# Patient Record
Sex: FEMALE | Race: WHITE
[De-identification: names, ages, dates, MRNs, and addresses within clinical notes are randomized per-mention and may not be internally consistent; named-entity substitution may affect disease eponyms.]

---

## 2019-06-15 ENCOUNTER — HOSPITAL ENCOUNTER (EMERGENCY)
Dept: HOSPITAL 59 - ER | Age: 49
Discharge: HOME | End: 2019-06-15
Payer: COMMERCIAL

## 2019-06-15 DIAGNOSIS — R20.0: ICD-10-CM

## 2019-06-15 DIAGNOSIS — R42: Primary | ICD-10-CM

## 2019-06-15 DIAGNOSIS — R05: ICD-10-CM

## 2019-06-15 DIAGNOSIS — R11.0: ICD-10-CM

## 2019-06-15 DIAGNOSIS — R07.89: ICD-10-CM

## 2019-06-15 DIAGNOSIS — I10: ICD-10-CM

## 2019-06-15 LAB
ABSOLUTE NEUTROPHIL COUNT: 11.14
ALBUMIN SERPL-MCNC: 4.7 G/DL (ref 4–5)
ALBUMIN/GLOB SERPL: 1.7 {RATIO} (ref 1.1–1.8)
ALP SERPL-CCNC: 118 U/L (ref 35–104)
ALT SERPL-CCNC: 33 U/L (ref ?–33)
ANION GAP SERPL CALC-SCNC: 19 MMOL/L (ref 7–16)
APTT BLD: 25.8 SECONDS (ref 24.5–39.1)
AST SERPL-CCNC: 29 U/L (ref 10–35)
BASOPHILS NFR BLD: 0.5 % (ref 0–6)
BILIRUB SERPL-MCNC: 0.5 MG/DL (ref 0.2–1)
BUN SERPL-MCNC: 18 MG/DL (ref 6–20)
CO2 SERPL-SCNC: 24 MMOL/L (ref 22–29)
CREAT SERPL-MCNC: 0.9 MG/DL (ref 0.5–0.9)
CREAT SERPL-MCNC: 1.2 MG/DL (ref 0.5–0.9)
EOSINOPHIL NFR BLD: 1.1 % (ref 0–6)
ERYTHROCYTE [DISTWIDTH] IN BLOOD BY AUTOMATED COUNT: 12.8 % (ref 11.5–14.5)
EST GLOMERULAR FILTRATION RATE: 51 ML/MIN
EST GLOMERULAR FILTRATION RATE: > 60 ML/MIN
GLOBULIN SER-MCNC: 2.8 GM/DL (ref 1.4–4.8)
GLUCOSE SERPL-MCNC: 167 MG/DL (ref 74–109)
GRANULOCYTES NFR BLD: 79.4 % (ref 47–80)
HCT VFR BLD CALC: 44.9 % (ref 35–47)
HGB BLD-MCNC: 15.7 GM/DL (ref 11.6–16)
INR PPP: 1.1
LYMPHOCYTES NFR BLD AUTO: 13.7 % (ref 16–45)
MCH RBC QN AUTO: 33.5 PG (ref 27–33)
MCHC RBC AUTO-ENTMCNC: 35 G/DL (ref 32–36)
MCV RBC AUTO: 95.7 FL (ref 81–97)
MONOCYTES NFR BLD: 5.3 % (ref 0–9)
PLATELET # BLD: 265 K/UL (ref 130–400)
PMV BLD AUTO: 11.4 FL (ref 7.4–10.4)
PROT SERPL-MCNC: 7.5 G/DL (ref 6.6–8.7)
PROTHROMBIN TIME: 10.8 SECONDS (ref 9.5–12.1)
RBC # BLD AUTO: 4.69 M/UL (ref 3.8–5.4)
TSH SERPL-ACNC: 1.51 UIU/ML (ref 0.27–4.2)
WBC # BLD AUTO: 14 K/UL (ref 4.2–12.2)

## 2019-06-15 PROCEDURE — 84484 ASSAY OF TROPONIN QUANT: CPT

## 2019-06-15 PROCEDURE — 93005 ELECTROCARDIOGRAM TRACING: CPT

## 2019-06-15 PROCEDURE — 85379 FIBRIN DEGRADATION QUANT: CPT

## 2019-06-15 PROCEDURE — 85730 THROMBOPLASTIN TIME PARTIAL: CPT

## 2019-06-15 PROCEDURE — 85610 PROTHROMBIN TIME: CPT

## 2019-06-15 PROCEDURE — 93010 ELECTROCARDIOGRAM REPORT: CPT

## 2019-06-15 PROCEDURE — 96361 HYDRATE IV INFUSION ADD-ON: CPT

## 2019-06-15 PROCEDURE — 80053 COMPREHEN METABOLIC PANEL: CPT

## 2019-06-15 PROCEDURE — 71046 X-RAY EXAM CHEST 2 VIEWS: CPT

## 2019-06-15 PROCEDURE — 82565 ASSAY OF CREATININE: CPT

## 2019-06-15 PROCEDURE — 85025 COMPLETE CBC W/AUTO DIFF WBC: CPT

## 2019-06-15 PROCEDURE — 99284 EMERGENCY DEPT VISIT MOD MDM: CPT

## 2019-06-15 PROCEDURE — 84443 ASSAY THYROID STIM HORMONE: CPT

## 2019-06-15 PROCEDURE — 83880 ASSAY OF NATRIURETIC PEPTIDE: CPT

## 2019-06-15 PROCEDURE — 84132 ASSAY OF SERUM POTASSIUM: CPT

## 2019-06-15 PROCEDURE — 96374 THER/PROPH/DIAG INJ IV PUSH: CPT

## 2019-06-15 NOTE — EMERGENCY DEPARTMENT RECORD
History of Present Illness





- General


Chief Complaint: Dizziness


Stated Complaint: DIZZY/LIGHTHEAD/BODY TREMBLING


Time Seen by Provider: 06/15/19 11:50


Source: Patient


Mode of Arrival: Ambulatory


Limitations: No limitations





- History of Present Illness


Initial Comments: 


The patient is here due to becoming very dizzy at breakfast about an hour and a 

half prior to presenting to the ER. She states she was sitting with her  

when she noticed the room spinning. The symptoms did worsen with any head 

movement or bending. She denied any HA, or visual changes but did become very 

anxious and diaphoretic. She did have some discomfort in her back also but that 

is mainly done now. The patient did have numbness and tingling over her arms and

hands for some time but is better now. There was never any ataxia, focal arm or 

leg numbness or weakness or any confusion or trouble talking. The patient has no

hx of similar issues but did just start two new BP medicines 4 days ago.





MD Complaint: Dizziness, Lightheadedness


Onset/Timin


-: Hour(s)


Timing: Sudden onset


History of Same: No


History of Trauma: No


Improves With: Nothing


Worsens With: Nothing


Associated Symptoms: Diaphoresis, Weakness





- [place] Coma Scale


Eye Response: (4) Open spontaneously


Motor Response: (6) Obeys commands


Verbal Response: (5) Oriented


Melissa Total: 15





- Related Data


                                Home Medications











 Medication  Instructions  Recorded  Confirmed  Last Taken


 


Candesartan Cilexetil 4 mg PO DAILY 06/15/19 06/15/19 Unknown


 


Chlorthalidone 25 mg PO DAILY 06/15/19 06/15/19 Unknown


 


Estradiol [Estrace] 1 mg PO DAILY 06/15/19 06/15/19 Unknown


 


Pantoprazole Sodium [Protonix] 40 mg PO DAILY 06/15/19 06/15/19 Unknown


 


Sertraline HCl [Zoloft] 50 mg PO DAILY 06/15/19 06/15/19 Unknown








                                  Previous Rx's











 Medication  Instructions  Recorded


 


Meclizine HCl [Antivert] 25 mg PO Q8H #21 tablet 06/15/19











                                    Allergies











Allergy/AdvReac Type Severity Reaction Status Date / Time


 


codeine Allergy  HIVES Verified 06/15/19 11:45














Travel Screening





- Travel/Exposure Within Last 30 Days


Have you traveled within the last 30 days?: No





- Travel/Exposure Within Last Year


Have you traveled outside the [place] in the last year?: No





- Additonal Travel Details


Have you been exposed to anyone with a communicable illness?: No





- Travel Symptoms


Symptom Screening: None





Review of Systems


Constitutional: Denies: Chills, Fever, Malaise


Eyes: Denies: Eye discharge


ENT: Denies: Congestion


Respiratory: Denies: Cough, Dyspnea


Cardiovascular: Denies: Arrhythmia, Chest pain


Endocrine: Denies: Fatigue


Gastrointestinal: Reports: Nausea.  Denies: Diarrhea


Genitourinary: Denies: Dysuria


Musculoskeletal: Denies: Arthralgia


Skin: Reports: Bruising


Neurological: Denies: Abnormal gait





Past Medical History





- SOCIAL HISTORY


Smoking Status: Current every day smoker


Alcohol Use: Occasional


Drug Use: None





- RESPIRATORY


Hx Respiratory Disorders: Yes


Hx Bronchitis: Yes





- CARDIOVASCULAR


Hx Cardio Disorders: Yes


Hx Hypertension: Yes





- NEURO


Hx Neuro Disorders: No





- GI


Hx GI Disorders: No





- 


Hx Genitourinary Disorders: No





- ENDOCRINE


Hx Endocrine Disorders: No





- MUSCULOSKELETAL


Hx Musculoskeletal Disorders: Yes


Hx Arthritis: Yes





- PSYCH


Hx Psych Problems: Yes


Hx Anxiety: Yes





- HEMATOLOGY/ONCOLOGY


Hx Hematology/Oncology Disorders: Yes


Hx Bruising: Yes





Family Medical History


Any Significant Family History?: No





Physical Exam





- General


General Appearance: Alert, Oriented x3, Cooperative, No acute distress





- Head


Head exam: Atraumatic, Normocephalic, Normal inspection





- Eye


Eye exam: Normal appearance, PERRL, EOMI, Nystagmus (horizontally bilaterally. )





- ENT


ENT exam: Normal exam, Mucous membranes moist, Normal external ear exam, Normal 

orophraynx, TM's normal bilaterally


Ear exam: negative: Normal external inspection (There is partial occlusion of 

the R ear canal.)


Throat exam: Normal inspection.  negative: Tonsillar erythema, Tonsillar exudate





- Neck


Neck exam: Normal inspection, Full ROM.  negative: Lymphadenopathy, Tenderness





- Respiratory


Respiratory exam: Normal lung sounds bilaterally.  negative: Respiratory 

distress





- Cardiovascular


Cardiovascular Exam: Regular rate, Normal rhythm, Normal heart sounds, 

Tachycardia





- GI/Abdominal


GI/Abdominal exam: Soft





- Extremities


Extremities exam: Normal inspection, Full ROM, Normal capillary refill.  

negative: Tenderness





- Neurological


Neurological exam: Alert, Normal gait, Oriented X3, Other (Neg Drift and 

Rhomberg.).  negative: Abnormal gait, Altered, Motor sensory deficit





- Psychiatric


Psychiatric exam: Anxious





- Skin


Skin exam: negative: Rash





Course





                                   Vital Signs











  06/15/19





  11:36


 


Temperature 98.8 F


 


Pulse Rate 126 H


 


Respiratory 24





Rate 


 


Blood Pressure 116/84


 


Pulse Ox 100














- Reevaluation(s)


Reevaluation #1: 


The patient is doing a lot better at this time. Her anxiety is improved and the 

dizziness is also much improved. She is resting comfortably. I did discuss the 

elevated creatinine and low potassium. We will treat those issues and recheck 

some lab tests.


06/15/19 12:59





Reevaluation #2: 


The patient is doing a lot better at this time. Her dizziness has resolved and 

she no longer has any anxiety, SOB, or sweating. She is feeling MUCH better and 

is receiving her 2nd liter of IVF. We will recheck her lab tests at 3pm and will

 recheck a D-dimer and trop.


06/15/19 13:46





Reevaluation #3: 


2nd EKG: NSR at 83, Neg ST changes, nonspecific T changes diffuse leads.


06/15/19 15:09





Reevaluation #4: 


The patient is doing a LOT better at this time and is basically back to normal. 

She has no dizziness and no anxiety or weakness. She is up walking with no pain,

 visual changes, nausea or ataxia. I did discuss the case with Dr. Arrieta and he 

would like the patient to stop the diuretic and continue the [place]. He would 

like to see the patient early this week for recheck and he agrees with the plan 

to discharge.


06/15/19 15:54








Medical Decision Making





- Data Complexity


MDM Data: Labs Ordered and/or Reviewed, X-Ray Ordered and/or Reviewed, EKG 

Ordered and/or Reviewed





- Lab Data


Result diagrams: 


                                 06/15/19 12:00





                                 06/15/19 14:55





- EKG Data


-: EKG Interpreted by Me (Sinus tach, nonspecific ST-T changes inferior and 

anterior leads.)





- Radiology Data


Radiology results: Report reviewed (CXR: Neg per Rad.)





Disposition


Disposition: Discharge


Clinical Impression: 


 Vertigo





Disposition: Home, Self-Care


Condition: (2) Stable


Instructions:  Dizziness (ED)


Additional Instructions: 


Please stop the Chlorthalidone and take the Antivert as directed. Drink plenty 

of fluids and see Dr. Arrieta in the office for a BP recheck on Tuesday or 

Wednesday. Return to the ER for any worsening symptoms.


Prescriptions: 


Meclizine HCl [Antivert] 25 mg PO Q8H #21 tablet


Forms:  Patient Portal Access


Time of Disposition: 15:57





Quality





- Quality Measures


Quality Measures: N/A





- Blood Pressure Screening


View Details: Yes


Does Patient Have Any of the Following: Active Dx of HTN


Blood Pressure Classification: Pre-Hypertensive BP Reading


Systolic Measurement: 124


Diastolic Measurement: 72


Screening for High Blood Pressure: Patient Exclusion, Hx of HTN []

## 2019-06-18 NOTE — RADIOLOGY REPORT
EXAM:  CHEST, TWO VIEWS



HISTORY:  COUGH, CHEST TIGHTNESS, AND BACK PAIN.  HISTORY OF ASTHMA.  



TECHNIQUE:  PA and lateral upright views of the chest were obtained.  



Comparison:  None.  



FINDINGS:  The heart, mediastinum, and pulmonary vasculature are normal.  There 
are no visible acute infiltrates or effusions.  There is no pneumothorax.  The 
bones appear intact.  



IMPRESSION:  

NO ACUTE CHEST PATHOLOGY.  



JOB NUMBER:  [id number]

MTDD

## 2019-12-07 ENCOUNTER — HOSPITAL ENCOUNTER (EMERGENCY)
Dept: HOSPITAL 59 - ER | Age: 49
Discharge: HOME | End: 2019-12-07
Payer: COMMERCIAL

## 2019-12-07 DIAGNOSIS — N30.01: Primary | ICD-10-CM

## 2019-12-07 DIAGNOSIS — I10: ICD-10-CM

## 2019-12-07 DIAGNOSIS — F17.210: ICD-10-CM

## 2019-12-07 LAB
APPEARANCE UR: (no result)
BACTERIA #/AREA URNS HPF: (no result) /[HPF]
BILIRUB UR-MCNC: (no result) MG/DL
COLOR UR: (no result)
EPI CELLS #/AREA URNS HPF: (no result) /[HPF]
GLUCOSE UR STRIP-MCNC: NEGATIVE MG/DL
KETONES UR QL STRIP: NEGATIVE
NITRITE UR QL STRIP: POSITIVE
RBC # UR STRIP: (no result) /UL
URINE LEUKOCYTE ESTERASE: (no result)
UROBILINOGEN UR STRIP-ACNC: 0.2 E.U./DL (ref 0.2–1)
WBC #/AREA URNS HPF: (no result) /[HPF]

## 2019-12-07 PROCEDURE — 81001 URINALYSIS AUTO W/SCOPE: CPT

## 2019-12-07 PROCEDURE — 99283 EMERGENCY DEPT VISIT LOW MDM: CPT

## 2019-12-07 NOTE — EMERGENCY DEPARTMENT RECORD
History of Present Illness





- General


Chief complaint: Female Urogenital Problem


Stated complaint: FEMALE UROGENTIAL


Time Seen by Provider: 12/07/19 11:05


Source: Patient


Mode of Arrival: Ambulatory


Limitations: No limitations





- History of Present Illness


Initial comments: 





Pt with blood in urine this AM and pressure in bladder. No fever, no flank pain,

no nausea.  Hx CAYLA.  No vag discharge. Hx of the same dx as UTI last year. 


Onset/Timing: 3


-: Days(s)


Radiation: Non-radiating, Suprapubic


Severity scale (1-10): 2


Quality: Cramping


Consistency: Intermittent


Improves with: None


Worsens with: Urination


Patient Pregnant: No


Associated Symptoms: Denies other symptoms





- Related Data


                                Home Medications











 Medication  Instructions  Recorded  Confirmed  Last Taken


 


Rosuvastatin Calcium 10 mg PO DAILY 12/07/19 12/07/19 Unknown


 


Verapamil HCl [Verapamil ER] 120 mg PO DAILY 12/07/19 12/07/19 Unknown








                                  Previous Rx's











 Medication  Instructions  Recorded


 


Nitrofurantoin Mono [Macrobid] 100 mg PO BID 5 Days #10 capsule 12/07/19


 


Phenazopyridine HCl [Pyridium] 200 mg PO TID 2 Days #6 tab 12/07/19











                                    Allergies











Allergy/AdvReac Type Severity Reaction Status Date / Time


 


codeine Allergy  HIVES Verified 06/15/19 11:45














Travel Screening





- Travel/Exposure Within Last 30 Days


Have you traveled within the last 30 days?: No





Review of Systems


Constitutional: Denies: Chills, Fever, Weakness


Eyes: Denies: Eye pain, Photophobia


ENT: Denies: Congestion, Ear pain


Respiratory: Denies: Dyspnea


Cardiovascular: Denies: Chest pain


Endocrine: Reports: Polyuria.  Denies: Fatigue


Gastrointestinal: Denies: Abdominal pain, Diarrhea, Nausea, Vomiting


Genitourinary: Reports: Dysuria, Frequency, Hematuria, Urgency.  Denies: 

Abnormal menses, Incontinence, Retention


Musculoskeletal: Denies: Back pain


Skin: Denies: Rash


Neurological: Denies: Confusion, Tingling


Psychiatric: Denies: Anxiety


Hematological/Lymphatic: Denies: Anemia





Past Medical History





- SOCIAL HISTORY


Smoking Status: Current every day smoker





- RESPIRATORY


Hx Respiratory Disorders: Yes


Hx Bronchitis: Yes





- CARDIOVASCULAR


Hx Cardio Disorders: Yes


Hx Hypertension: Yes





- NEURO


Hx Neuro Disorders: No





- GI


Hx GI Disorders: No





- 


Hx Genitourinary Disorders: No





- ENDOCRINE


Hx Endocrine Disorders: No





- MUSCULOSKELETAL


Hx Musculoskeletal Disorders: Yes


Hx Arthritis: Yes





- PSYCH


Hx Psych Problems: Yes


Hx Anxiety: Yes





- HEMATOLOGY/ONCOLOGY


Hx Hematology/Oncology Disorders: Yes


Hx Bruising: Yes





Family Medical History


Any Significant Family History?: No





Physical Exam





- General


General Appearance: Alert, Oriented x3, Cooperative, No acute distress





- Head


Head exam: Normal inspection





- Eye


Eye exam: Normal appearance, PERRL





- ENT


ENT exam: Mucous membranes moist


Ear exam: Normal external inspection


Nasal Exam: Normal inspection


Mouth exam: Normal external inspection





- Neck


Neck exam: Normal inspection





- Respiratory


Respiratory exam: Normal lung sounds bilaterally.  negative: Respiratory 

distress





- Cardiovascular


Cardiovascular Exam: Regular rate, Normal rhythm





- GI/Abdominal


GI/Abdominal exam: Soft, Normal bowel sounds.  negative: Distended, Guarding, 

Tenderness





- Extremities


Extremities exam: Normal inspection





- Back


Back exam: Reports: Normal inspection.  Denies: CVA tenderness (R), CVA 

tenderness (L)





- Neurological


Neurological exam: Alert, Normal gait, Oriented X3





- Psychiatric


Psychiatric exam: Normal affect, Normal mood





- Skin


Skin exam: Normal color





Course





                                   Vital Signs











  12/07/19





  10:27


 


Temperature 99.0 F


 


Pulse Rate 110 H


 


Respiratory 20





Rate 


 


Blood Pressure 192/123


 


Pulse Ox 97














Disposition


Disposition: Discharge


Clinical Impression: 


UTI (urinary tract infection)


Qualifiers:


 Urinary tract infection type: acute cystitis Hematuria presence: with hematuria

 Qualified Code(s): N30.01 - Acute cystitis with hematuria





Disposition: Home, Self-Care


Condition: (1) Good


Instructions:  Urinary Tract Infection in Women (ED)


Prescriptions: 


Nitrofurantoin Mono [Macrobid] 100 mg PO BID 5 Days #10 capsule


Phenazopyridine HCl [Pyridium] 200 mg PO TID 2 Days #6 tab


Time of Disposition: 11:09





Quality





- Quality Measures


Quality Measures: N/A





- Blood Pressure Screening


Does Patient Have Any of the Following: No, Active Dx of HTN


Blood Pressure Classification: Hypertensive Reading


Systolic Measurement: 192


Diastolic Measurement: 123


Screening for High Blood Pressure: Patient Exclusion, Hx of HTN []